# Patient Record
Sex: FEMALE | Race: WHITE | NOT HISPANIC OR LATINO | Employment: OTHER | URBAN - METROPOLITAN AREA
[De-identification: names, ages, dates, MRNs, and addresses within clinical notes are randomized per-mention and may not be internally consistent; named-entity substitution may affect disease eponyms.]

---

## 2018-12-20 ENCOUNTER — TELEPHONE (OUTPATIENT)
Dept: RHEUMATOLOGY | Facility: CLINIC | Age: 53
End: 2018-12-20

## 2018-12-20 NOTE — TELEPHONE ENCOUNTER
Contacted patient scheduled new patient appointment. Patient aware of date and time of appointments. Needs to be evaluated soon will transfer to [facility] when appointment available.

## 2018-12-20 NOTE — TELEPHONE ENCOUNTER
----- Message from Mary Zelaya sent at 12/20/2018 12:54 PM CST -----  Contact: Patient  Type: Needs Medical Advice    Who Called:  Patient  Symptoms (please be specific):  Lupus  How long has patient had these symptoms:  na  Pharmacy name and phone #:  baldo  Best Call Back Number:   Additional Information: Calling to schedule a new patient appt. Please advise.

## 2019-01-02 ENCOUNTER — HOSPITAL ENCOUNTER (OUTPATIENT)
Dept: RADIOLOGY | Facility: HOSPITAL | Age: 54
Discharge: HOME | End: 2019-01-02
Attending: STUDENT IN AN ORGANIZED HEALTH CARE EDUCATION/TRAINING PROGRAM
Payer: MEDICARE

## 2019-01-02 ENCOUNTER — OFFICE VISIT (OUTPATIENT)
Dept: RHEUMATOLOGY | Facility: CLINIC | Age: 54
End: 2019-01-02
Payer: MEDICARE

## 2019-01-02 VITALS — HEART RATE: 106 BPM | WEIGHT: 91.94 LBS | DIASTOLIC BLOOD PRESSURE: 63 MMHG | SYSTOLIC BLOOD PRESSURE: 102 MMHG

## 2019-01-02 DIAGNOSIS — M32.9 SYSTEMIC LUPUS ERYTHEMATOSUS, UNSPECIFIED SLE TYPE, UNSPECIFIED ORGAN INVOLVEMENT STATUS: ICD-10-CM

## 2019-01-02 DIAGNOSIS — M32.9 SYSTEMIC LUPUS ERYTHEMATOSUS, UNSPECIFIED SLE TYPE, UNSPECIFIED ORGAN INVOLVEMENT STATUS: Primary | ICD-10-CM

## 2019-01-02 PROCEDURE — 73130 XR HAND COMPLETE 3 VIEWS BILATERAL: ICD-10-PCS | Mod: 26,50,, | Performed by: RADIOLOGY

## 2019-01-02 PROCEDURE — 99204 OFFICE O/P NEW MOD 45 MIN: CPT | Mod: S$PBB,,, | Performed by: STUDENT IN AN ORGANIZED HEALTH CARE EDUCATION/TRAINING PROGRAM

## 2019-01-02 PROCEDURE — 99999 PR PBB SHADOW E&M-EST. PATIENT-LVL III: ICD-10-PCS | Mod: PBBFAC,,, | Performed by: STUDENT IN AN ORGANIZED HEALTH CARE EDUCATION/TRAINING PROGRAM

## 2019-01-02 PROCEDURE — 99204 PR OFFICE/OUTPT VISIT, NEW, LEVL IV, 45-59 MIN: ICD-10-PCS | Mod: S$PBB,,, | Performed by: STUDENT IN AN ORGANIZED HEALTH CARE EDUCATION/TRAINING PROGRAM

## 2019-01-02 PROCEDURE — 73130 X-RAY EXAM OF HAND: CPT | Mod: 26,50,, | Performed by: RADIOLOGY

## 2019-01-02 PROCEDURE — 73130 X-RAY EXAM OF HAND: CPT | Mod: 50,TC,FY,PO

## 2019-01-02 PROCEDURE — 73030 X-RAY EXAM OF SHOULDER: CPT | Mod: 26,50,, | Performed by: RADIOLOGY

## 2019-01-02 PROCEDURE — 99999 PR PBB SHADOW E&M-EST. PATIENT-LVL III: CPT | Mod: PBBFAC,,, | Performed by: STUDENT IN AN ORGANIZED HEALTH CARE EDUCATION/TRAINING PROGRAM

## 2019-01-02 PROCEDURE — 73030 XR SHOULDER COMPLETE 2 OR MORE VIEWS BILATERAL: ICD-10-PCS | Mod: 26,50,, | Performed by: RADIOLOGY

## 2019-01-02 PROCEDURE — 73030 X-RAY EXAM OF SHOULDER: CPT | Mod: TC,50,FY,PO

## 2019-01-02 PROCEDURE — 99213 OFFICE O/P EST LOW 20 MIN: CPT | Mod: PBBFAC,25,PO | Performed by: STUDENT IN AN ORGANIZED HEALTH CARE EDUCATION/TRAINING PROGRAM

## 2019-01-02 RX ORDER — OXYCODONE AND ACETAMINOPHEN 7.5; 325 MG/1; MG/1
TABLET ORAL
COMMUNITY
End: 2019-09-19

## 2019-01-02 RX ORDER — PANTOPRAZOLE SODIUM 40 MG/1
40 TABLET, DELAYED RELEASE ORAL DAILY
COMMUNITY
Start: 2017-11-20

## 2019-01-02 RX ORDER — HYDROCODONE BITARTRATE AND ACETAMINOPHEN 7.5; 325 MG/1; MG/1
TABLET ORAL
Refills: 0 | COMMUNITY
Start: 2018-10-01 | End: 2019-09-19

## 2019-01-02 RX ORDER — MIDODRINE HYDROCHLORIDE 5 MG/1
10 TABLET ORAL
Refills: 11 | COMMUNITY
Start: 2018-12-08 | End: 2020-04-30

## 2019-01-02 RX ORDER — PROMETHAZINE HYDROCHLORIDE 25 MG/1
25 TABLET ORAL EVERY 6 HOURS
COMMUNITY

## 2019-01-02 RX ORDER — THYROID 30 MG/1
30 TABLET ORAL DAILY
COMMUNITY

## 2019-01-02 RX ORDER — HYDROCODONE BITARTRATE AND ACETAMINOPHEN 10; 325 MG/1; MG/1
TABLET ORAL
Refills: 0 | COMMUNITY
Start: 2018-10-12 | End: 2019-09-19

## 2019-01-02 RX ORDER — PROMETHAZINE HYDROCHLORIDE 6.25 MG/5ML
SYRUP ORAL
COMMUNITY
End: 2019-09-19

## 2019-01-02 RX ORDER — GABAPENTIN 600 MG/1
100 TABLET ORAL
COMMUNITY
Start: 2017-11-22

## 2019-01-02 RX ORDER — HYDROXYCHLOROQUINE SULFATE 200 MG/1
200 TABLET, FILM COATED ORAL DAILY
Qty: 30 TABLET | Refills: 11 | Status: SHIPPED | OUTPATIENT
Start: 2019-01-02 | End: 2019-09-19

## 2019-01-02 RX ORDER — PROMETHAZINE HYDROCHLORIDE 6.25 MG/5ML
SYRUP ORAL
Refills: 0 | COMMUNITY
Start: 2018-12-31 | End: 2020-04-30

## 2019-01-02 RX ORDER — PYRIDOSTIGMINE BROMIDE 60 MG/1
60 TABLET ORAL 3 TIMES DAILY
COMMUNITY
Start: 2017-10-13

## 2019-01-02 RX ORDER — METHOCARBAMOL 750 MG/1
TABLET, FILM COATED ORAL
COMMUNITY
End: 2020-04-30

## 2019-01-02 RX ORDER — TEMAZEPAM 7.5 MG/1
CAPSULE ORAL
COMMUNITY
End: 2019-04-03 | Stop reason: ALTCHOICE

## 2019-01-02 RX ORDER — HYDROCODONE BITARTRATE AND ACETAMINOPHEN 7.5; 325 MG/15ML; MG/15ML
SOLUTION ORAL
COMMUNITY
End: 2019-09-19

## 2019-01-02 RX ORDER — OXYCODONE AND ACETAMINOPHEN 5; 325 MG/1; MG/1
TABLET ORAL
COMMUNITY
End: 2019-09-19

## 2019-01-02 RX ORDER — LIFITEGRAST 50 MG/ML
SOLUTION/ DROPS OPHTHALMIC
Refills: 11 | COMMUNITY
Start: 2018-11-15 | End: 2020-04-30

## 2019-01-02 RX ORDER — DOCUSATE SODIUM 100 MG/1
100 CAPSULE, LIQUID FILLED ORAL
COMMUNITY

## 2019-01-02 RX ORDER — HYDROCODONE BITARTRATE AND ACETAMINOPHEN 10; 325 MG/1; MG/1
TABLET ORAL
COMMUNITY
End: 2019-09-19

## 2019-01-02 RX ORDER — DICLOFENAC SODIUM 10 MG/G
2 GEL TOPICAL 4 TIMES DAILY
Qty: 1 TUBE | Refills: 3 | Status: SHIPPED | OUTPATIENT
Start: 2019-01-02 | End: 2019-09-19

## 2019-01-02 RX ORDER — EPINEPHRINE 0.3 MG/.3ML
INJECTION SUBCUTANEOUS
COMMUNITY

## 2019-01-02 RX ORDER — ONDANSETRON 4 MG/1
TABLET, ORALLY DISINTEGRATING ORAL
COMMUNITY
End: 2020-04-30

## 2019-01-02 RX ORDER — VALACYCLOVIR HYDROCHLORIDE 500 MG/1
TABLET, FILM COATED ORAL
COMMUNITY
End: 2019-09-19

## 2019-01-02 RX ORDER — MIDODRINE HYDROCHLORIDE 5 MG/1
TABLET ORAL
COMMUNITY
Start: 2016-10-04 | End: 2019-09-19

## 2019-01-02 RX ORDER — HYDROXYCHLOROQUINE SULFATE 200 MG/1
TABLET, FILM COATED ORAL
COMMUNITY
End: 2020-04-30 | Stop reason: SDUPTHER

## 2019-01-02 RX ORDER — RIZATRIPTAN BENZOATE 10 MG/1
TABLET, ORALLY DISINTEGRATING ORAL
COMMUNITY
End: 2019-09-19

## 2019-01-02 RX ORDER — TOPIRAMATE SPINKLE 25 MG/1
50 CAPSULE ORAL 2 TIMES DAILY
COMMUNITY
End: 2020-04-30

## 2019-01-02 RX ORDER — ONDANSETRON 4 MG/1
TABLET, FILM COATED ORAL
COMMUNITY
Start: 2017-06-22 | End: 2019-09-19

## 2019-01-02 RX ORDER — HYDROCODONE BITARTRATE AND ACETAMINOPHEN 7.5; 325 MG/1; MG/1
TABLET ORAL
COMMUNITY
End: 2019-09-19

## 2019-01-02 RX ORDER — MEPERIDINE HYDROCHLORIDE 50 MG/1
TABLET ORAL
COMMUNITY
End: 2019-09-19

## 2019-01-02 RX ORDER — PREDNISONE 10 MG/1
TABLET ORAL
COMMUNITY
End: 2019-09-19

## 2019-01-02 RX ORDER — DIAZEPAM 5 MG/1
TABLET ORAL
COMMUNITY
Start: 2017-09-03 | End: 2019-09-19

## 2019-01-02 RX ORDER — ERGOCALCIFEROL 1.25 MG/1
CAPSULE ORAL
Refills: 3 | COMMUNITY
Start: 2018-10-15

## 2019-01-02 RX ORDER — TAMSULOSIN HYDROCHLORIDE 0.4 MG/1
CAPSULE ORAL
COMMUNITY
End: 2019-09-19

## 2019-01-02 RX ORDER — OXYCODONE AND ACETAMINOPHEN 7.5; 325 MG/1; MG/1
TABLET ORAL
Refills: 0 | COMMUNITY
Start: 2018-12-06 | End: 2019-09-19

## 2019-01-02 NOTE — PROGRESS NOTES
RHEUMATOLOGY OUTPATIENT CLINIC NOTE    1/2/2019    Attending Rheumatologist: Bella Pastor  Primary Care Provider: Primary Doctor No   Physician Requesting Consultation: Aaareferral Self  No address on file  Chief Complaint/Reason For Consultation:  New Patient (Establish care with new Rheumatologist)      Subjective:       HPI  Kayla Bailey is a 53 y.o. White female    -Diagnosed w SLE 20 years ago, had previously been following with a rheumatologist in [place] , Dr Purvis.  Main manifestations: transaminitis, fatigue, pericarditis, photosensitivity, malar rash, and 2 miscarriages (1 in first trimester, and one in second).  Recently with worsening pain in bilateral shoulders, mcps, and pips. AM stiffness >30min. Taking tylenol prn pain relief. Taking plaquenil 200mg daily, has overall been stable with this regimen. No other new complaints or symptoms.     Review of Systems   Constitutional: Negative for chills, fever and weight loss.   HENT: Negative for congestion, hearing loss, sinus pain and sore throat.    Eyes: Negative for blurred vision, photophobia, pain and redness.   Respiratory: Negative for cough, sputum production and shortness of breath.    Cardiovascular: Negative for chest pain, palpitations and leg swelling.   Gastrointestinal: Negative for abdominal pain, blood in stool, heartburn, nausea and vomiting.   Genitourinary: Negative for dysuria, frequency and hematuria.   Musculoskeletal: Negative for back pain,+ joint pain, myalgias and neck pain.   Skin: Negative for itching and rash.   Neurological: Negative for dizziness, focal weakness, seizures and headaches.   Endo/Heme/Allergies: Negative for polydipsia. Does not bruise/bleed easily.   All other systems reviewed and are negative.        Chronic comorbid conditions affecting medical decision making today:  No past medical history on file.  No past surgical history on file.  No family history on file.  Social History     Substance and  Sexual Activity   Alcohol Use Not on file     Social History     Tobacco Use   Smoking Status Not on file     Social History     Substance and Sexual Activity   Drug Use Not on file       Current Outpatient Medications:     diazePAM (VALIUM) 5 MG tablet, diazepam 5 mg tablet, Disp: , Rfl:     docusate sodium (COLACE) 100 MG capsule, Take 100 mg by mouth., Disp: , Rfl:     EPINEPHrine (EPIPEN) 0.3 mg/0.3 mL AtIn, epinephrine 0.3 mg/0.3 mL injection, auto-injector, Disp: , Rfl:     ergocalciferol (ERGOCALCIFEROL) 50,000 unit Cap, TK 1 C PO ONCE A MONTH, Disp: , Rfl: 3    gabapentin (NEURONTIN) 600 MG tablet, 100 mg by FEEDING TUBE route., Disp: , Rfl:     hydroxychloroquine (PLAQUENIL) 200 mg tablet, hydroxychloroquine 200 mg tablet  TK 1 T PO QD, Disp: , Rfl:     methocarbamol (ROBAXIN) 750 MG Tab, methocarbamol 750 mg tablet  TAKE 1 TABLET BY MOUTH THREE TIMES DAILY, Disp: , Rfl:     midodrine (PROAMATINE) 5 MG Tab, TK 1 T PO QID, Disp: , Rfl: 11    midodrine (PROAMATINE) 5 MG Tab, midodrine 5 mg tablet  TK 1 T PO QID, Disp: , Rfl:     ondansetron (ZOFRAN ODT) 4 MG TbDL, Zofran ODT 4 mg disintegrating tablet  Let 1 tablet disolve in mouth every 6-8 hours as needed by oral route., Disp: , Rfl:     ondansetron (ZOFRAN) 4 MG tablet, ondansetron HCl 4 mg tablet, Disp: , Rfl:     oxyCODONE-acetaminophen (PERCOCET) 7.5-325 mg per tablet, oxycodone-acetaminophen 7.5 mg-325 mg tablet  TK 1 T PO  TID PRF PAIN. MAXIMUM DAILY DOSE OF 4 TS A DAY, Disp: , Rfl:     oxyCODONE-acetaminophen (PERCOCET) 7.5-325 mg per tablet, TK 1 T PO  TID PRF PAIN. MAXIMUM DAILY DOSE OF 4 TS A DAY, Disp: , Rfl: 0    pantoprazole (PROTONIX) 40 MG tablet, pantoprazole 40 mg tablet,delayed release, Disp: , Rfl:     promethazine (PHENERGAN) 25 MG tablet, promethazine 25 mg tablet, Disp: , Rfl:     promethazine (PHENERGAN) 6.25 mg/5 mL syrup, TK 20 ML PO QD PRN, Disp: , Rfl: 0    promethazine (PHENERGAN) 6.25 mg/5 mL syrup, promethazine  6.25 mg/5 mL syrup  TK 20 ML PO QD PRN, Disp: , Rfl:     pyridostigmine (MESTINON) 60 mg Tab, pyridostigmine bromide 60 mg tablet, Disp: , Rfl:     temazepam (RESTORIL) 7.5 MG Cap, Restoril 7.5 mg capsule  Take 1 capsule every day by oral route at bedtime., Disp: , Rfl:     thyroid, pork, (ARMOUR THYROID) 30 mg Tab, [place] Thyroid, Disp: , Rfl:     topiramate 25 mg capsule, topiramate 25 mg sprinkle capsule, Disp: , Rfl:     XIIDRA 5 % Dpet, INT 1 GTT IN BOTH EYES BID, Disp: , Rfl: 11    hydrocodone-acetaminophen (HYCET) solution 7.5-325 mg/15mL, hydrocodone 7.5 mg-acetaminophen 325 mg/15 mL oral solution, Disp: , Rfl:     HYDROcodone-acetaminophen (NORCO)  mg per tablet, TK 1 T PO TID PRN P, Disp: , Rfl: 0    HYDROcodone-acetaminophen (NORCO)  mg per tablet, hydrocodone 10 mg-acetaminophen 325 mg tablet  TK 1 T PO TID PRN P, Disp: , Rfl:     HYDROcodone-acetaminophen (NORCO) 7.5-325 mg per tablet, hydrocodone 7.5 mg-acetaminophen 325 mg tablet, Disp: , Rfl:     HYDROcodone-acetaminophen (NORCO) 7.5-325 mg per tablet, TK 1 T PO  Q 8 H PRN FOR PAIN, Disp: , Rfl: 0    meperidine (DEMEROL) 50 mg tablet, meperidine 50 mg tablet, Disp: , Rfl:     oxyCODONE-acetaminophen (PERCOCET) 5-325 mg per tablet, oxycodone-acetaminophen 5 mg-325 mg tablet, Disp: , Rfl:     predniSONE (DELTASONE) 10 MG tablet, prednisone 10 mg tablet, Disp: , Rfl:     rizatriptan (MAXALT-MLT) 10 MG disintegrating tablet, rizatriptan 10 mg disintegrating tablet, Disp: , Rfl:     tamsulosin (FLOMAX) 0.4 mg Cap, tamsulosin 0.4 mg capsule, Disp: , Rfl:     valACYclovir (VALTREX) 500 MG tablet, valacyclovir 500 mg tablet, Disp: , Rfl:        Objective:         Vitals:    01/02/19 1558   BP: 102/63   Pulse: 106       Physical Exam   Nursing note and vitals reviewed.  Constitutional:  oriented to person, place, and time and well-developed, well-nourished, and in no distress. No distress.   HENT:   Head: Normocephalic and  atraumatic.   Right Ear: External ear normal.   Left Ear: External ear normal.   Nose: Nose normal.   Mouth/Throat: Oropharynx is clear and moist.   Eyes: Conjunctivae and EOM are normal. Pupils are equal, round, and reactive to light. Right eye exhibits no discharge. Left eye exhibits no discharge.   Neck: Normal range of motion. Neck supple. No JVD present.   Cardiovascular: Normal rate, regular rhythm and normal heart sounds.    Pulmonary/Chest: Effort normal and breath sounds normal. No respiratory distress. She has no wheezes. She has no rales.  exhibits no tenderness.   Abdominal: Soft. Bowel sounds are normal.  exhibits no distension. There is no tenderness. There is no rebound.   Lymphadenopathy:  no cervical adenopathy.   Neurological: alert and oriented to person, place, and time. No cranial nerve deficit. Gait normal.   Skin: Skin is warm and dry. No rash noted. No erythema.   Psychiatric: Affect and judgment normal.   Musculoskeletal: No synovitis over small joints in hands and feet. No effusions over large joints. +impingment sign bilaterally w tenderness at subAcromial bursa.     Reviewed old and all outside pertinent medical records available.    All lab results personally reviewed and interpreted by me.  No results found for: WBC, HGB, HCT, MCV, MCH, MCHC, RDW, PLT, MPV, NEUTROABS, LYMPHOABS, MONOABS, EOSINOABS, BASOSABS, NEUTROPCT, LYMPHOPCT, MONOPCT, EOSINOPCT, BASOPCT, DIFFTYPE, RBCMORPHOLOG, PLTEST    No results found for: NA, K, CL, CO2, GLU, BUN, LABCREA, CALCIUM, PROT, ALBUMIN, BILITOT, AST, ALKPHOS, ALT, GFRAA, GFRNONAA    No results found for: COLORU, APPEARANCEUA, SPECGRAV, PHUR, PROTEINUA, GLUCOSEU, KETONESU, BLOODU, LEUKOCYTESUR, NITRITE, UROBILINOGEN    No results found for: CRP    No results found for: SEDRATE, ERYTHROCYTES    No results found for: DON, RF, SEDRATE    No components found for: 25OHVITDTOT, 88CYBDSS4, 42VHCFIK6, METHODNOTE    No results found for: URICACID    No  components found for: TSPOTTB           ASSESSMENT / PLAN:     Kayla Bailey is a 53 y.o. White female with:    #SLE  -diagnosed 20+ years ago, had been following w Dr Purvis in [place]   -main manifestations: malar rash, photosensitivity, fatigue, pericarditis  -will get baseline labs and studies, assess disease activity markers  -stable on plaquenil 200mg daily, continue current regimen  -will check RF/CCP , hand xr for eval inflammatory arthrititis    #Shoulder impingement  -discussed PT , can consider referral at rtc  -consider injection at rtc if no improvement  -prx voltaren gel      . Other specified counseling  - Immunization counseling done  - Weight loss counseling done  - Nutrition and exercise counseling  - Medication counseling provided      No Follow-up on file.    Method of contact with patient concerns: [facility]      Bella Pastor M.D.  Rheumatology Department   [facility] [place] [address], [place] [address]  Phone: [phone]  Fax: [phone]

## 2019-01-03 ENCOUNTER — PATIENT MESSAGE (OUTPATIENT)
Dept: RHEUMATOLOGY | Facility: CLINIC | Age: 54
End: 2019-01-03

## 2019-04-03 ENCOUNTER — PATIENT MESSAGE (OUTPATIENT)
Dept: RHEUMATOLOGY | Facility: CLINIC | Age: 54
End: 2019-04-03

## 2019-04-03 RX ORDER — TEMAZEPAM 30 MG/1
30 CAPSULE ORAL NIGHTLY PRN
Qty: 30 CAPSULE | Refills: 3 | Status: SHIPPED | OUTPATIENT
Start: 2019-04-03 | End: 2019-04-08

## 2019-04-08 ENCOUNTER — TELEPHONE (OUTPATIENT)
Dept: RHEUMATOLOGY | Facility: CLINIC | Age: 54
End: 2019-04-08

## 2019-04-08 RX ORDER — TEMAZEPAM 30 MG/1
30 CAPSULE ORAL NIGHTLY PRN
Qty: 30 CAPSULE | Refills: 3 | Status: SHIPPED | OUTPATIENT
Start: 2019-04-08 | End: 2019-05-08

## 2019-04-08 RX ORDER — TEMAZEPAM 30 MG/1
30 CAPSULE ORAL NIGHTLY PRN
Qty: 30 CAPSULE | Refills: 3 | Status: CANCELLED | OUTPATIENT
Start: 2019-04-08 | End: 2019-05-08

## 2019-04-08 NOTE — TELEPHONE ENCOUNTER
----- Message from Lilian Garcia sent at 4/8/2019 10:14 AM CDT -----  Contact: pt  1. What is the name of the medication you are requesting? Temazapam  2. What is the dose? n/a  3. How do you take the medication? Orally, topically, etc? n/a  4. How often do you take this medication? n/a  5. Do you need a 30 day or 90 day supply? n/a  6. How many refills are you requesting? n/a  7. What is your preferred pharmacy and location of the pharmacy? [facility] [address] [facility]'t [facility] this [facility]  8. Who can we contact with further questions? The pt at [phone]

## 2019-04-08 NOTE — TELEPHONE ENCOUNTER
Informed Mrs. Bailey rx refill has been faxed to [facility] in [place]. Mrs. Bailey voiced understanding

## 2019-04-08 NOTE — TELEPHONE ENCOUNTER
Dr. Pastor,  Mrs. Leo would like you to resend her rx for Temazepam to [facility] in [place] due to [facility] not having Temazepam in stock

## 2019-04-08 NOTE — TELEPHONE ENCOUNTER
Spoke with Mrs. Bailey she stated to disregard the previous message regarding Temazepam being unavailable at [facility] and if she requires further assistance she will call our clinic

## 2019-04-08 NOTE — TELEPHONE ENCOUNTER
----- Message from Lainey Doyle sent at 4/8/2019  2:21 PM CDT -----  Pt needs to speak to the nurse regarding send a rx to help her sleep to [facility] [phone]/please call [phone]/ma

## 2019-07-25 ENCOUNTER — PATIENT MESSAGE (OUTPATIENT)
Dept: RHEUMATOLOGY | Facility: CLINIC | Age: 54
End: 2019-07-25

## 2019-07-25 ENCOUNTER — TELEPHONE (OUTPATIENT)
Dept: RHEUMATOLOGY | Facility: CLINIC | Age: 54
End: 2019-07-25

## 2019-07-25 NOTE — TELEPHONE ENCOUNTER
Unable to leave voice message due to voicemail not being setup. I sent Mrs. Garibayn a message through the patient portal regarding rescheduling her appointment with Dr. Pastor

## 2019-07-29 ENCOUNTER — TELEPHONE (OUTPATIENT)
Dept: RHEUMATOLOGY | Facility: CLINIC | Age: 54
End: 2019-07-29

## 2019-07-29 NOTE — TELEPHONE ENCOUNTER
Spoke with Mrs. Bailey regarding rescheduling her scheduled appointment with Dr. Pastor 8/5/2019. Mrs. Moe stated that she is presently out of town and she will call clinic to reschedule or she will reschedule through the patient portal

## 2019-09-19 ENCOUNTER — LAB VISIT (OUTPATIENT)
Dept: LAB | Facility: HOSPITAL | Age: 54
End: 2019-09-19
Attending: STUDENT IN AN ORGANIZED HEALTH CARE EDUCATION/TRAINING PROGRAM
Payer: MEDICARE

## 2019-09-19 ENCOUNTER — OFFICE VISIT (OUTPATIENT)
Dept: RHEUMATOLOGY | Facility: CLINIC | Age: 54
End: 2019-09-19
Payer: MEDICARE

## 2019-09-19 ENCOUNTER — TELEPHONE (OUTPATIENT)
Dept: PHARMACY | Facility: CLINIC | Age: 54
End: 2019-09-19

## 2019-09-19 VITALS
SYSTOLIC BLOOD PRESSURE: 110 MMHG | HEART RATE: 85 BPM | BODY MASS INDEX: 17.21 KG/M2 | HEIGHT: 62 IN | DIASTOLIC BLOOD PRESSURE: 69 MMHG | WEIGHT: 93.5 LBS

## 2019-09-19 DIAGNOSIS — M32.9 SYSTEMIC LUPUS ERYTHEMATOSUS, UNSPECIFIED SLE TYPE, UNSPECIFIED ORGAN INVOLVEMENT STATUS: ICD-10-CM

## 2019-09-19 DIAGNOSIS — Z01.89 ENCOUNTER FOR OTHER SPECIFIED SPECIAL EXAMINATIONS: ICD-10-CM

## 2019-09-19 DIAGNOSIS — M70.60 TROCHANTERIC BURSITIS, UNSPECIFIED LATERALITY: ICD-10-CM

## 2019-09-19 DIAGNOSIS — R76.8 POSITIVE ANA (ANTINUCLEAR ANTIBODY): ICD-10-CM

## 2019-09-19 DIAGNOSIS — R76.8 POSITIVE ANA (ANTINUCLEAR ANTIBODY): Primary | ICD-10-CM

## 2019-09-19 LAB
ALBUMIN SERPL BCP-MCNC: 3.9 G/DL (ref 3.5–5.2)
ALP SERPL-CCNC: 89 U/L (ref 55–135)
ALT SERPL W/O P-5'-P-CCNC: 9 U/L (ref 10–44)
ANION GAP SERPL CALC-SCNC: 11 MMOL/L (ref 8–16)
AST SERPL-CCNC: 14 U/L (ref 10–40)
BASOPHILS # BLD AUTO: 0.09 K/UL (ref 0–0.2)
BASOPHILS NFR BLD: 1 % (ref 0–1.9)
BILIRUB SERPL-MCNC: 0.2 MG/DL (ref 0.1–1)
BUN SERPL-MCNC: 8 MG/DL (ref 6–20)
C3 SERPL-MCNC: 130 MG/DL (ref 50–180)
C4 SERPL-MCNC: 26 MG/DL (ref 11–44)
CALCIUM SERPL-MCNC: 9.5 MG/DL (ref 8.7–10.5)
CHLORIDE SERPL-SCNC: 112 MMOL/L (ref 95–110)
CO2 SERPL-SCNC: 22 MMOL/L (ref 23–29)
CREAT SERPL-MCNC: 0.8 MG/DL (ref 0.5–1.4)
CRP SERPL-MCNC: 3.2 MG/L (ref 0–8.2)
DIFFERENTIAL METHOD: ABNORMAL
EOSINOPHIL # BLD AUTO: 0.2 K/UL (ref 0–0.5)
EOSINOPHIL NFR BLD: 1.7 % (ref 0–8)
ERYTHROCYTE [DISTWIDTH] IN BLOOD BY AUTOMATED COUNT: 13.5 % (ref 11.5–14.5)
ERYTHROCYTE [SEDIMENTATION RATE] IN BLOOD BY WESTERGREN METHOD: 3 MM/HR (ref 0–36)
EST. GFR  (AFRICAN AMERICAN): >60 ML/MIN/1.73 M^2
EST. GFR  (NON AFRICAN AMERICAN): >60 ML/MIN/1.73 M^2
GLUCOSE SERPL-MCNC: 93 MG/DL (ref 70–110)
HCT VFR BLD AUTO: 46.1 % (ref 37–48.5)
HGB BLD-MCNC: 14.7 G/DL (ref 12–16)
IMM GRANULOCYTES # BLD AUTO: 0.02 K/UL (ref 0–0.04)
IMM GRANULOCYTES NFR BLD AUTO: 0.2 % (ref 0–0.5)
LYMPHOCYTES # BLD AUTO: 1.7 K/UL (ref 1–4.8)
LYMPHOCYTES NFR BLD: 18.4 % (ref 18–48)
MCH RBC QN AUTO: 31.2 PG (ref 27–31)
MCHC RBC AUTO-ENTMCNC: 31.9 G/DL (ref 32–36)
MCV RBC AUTO: 98 FL (ref 82–98)
MONOCYTES # BLD AUTO: 0.3 K/UL (ref 0.3–1)
MONOCYTES NFR BLD: 3.4 % (ref 4–15)
NEUTROPHILS # BLD AUTO: 6.9 K/UL (ref 1.8–7.7)
NEUTROPHILS NFR BLD: 75.5 % (ref 38–73)
NRBC BLD-RTO: 0 /100 WBC
PLATELET # BLD AUTO: 263 K/UL (ref 150–350)
PMV BLD AUTO: 10.1 FL (ref 9.2–12.9)
POTASSIUM SERPL-SCNC: 3.9 MMOL/L (ref 3.5–5.1)
PROT SERPL-MCNC: 7 G/DL (ref 6–8.4)
RBC # BLD AUTO: 4.71 M/UL (ref 4–5.4)
SODIUM SERPL-SCNC: 145 MMOL/L (ref 136–145)
WBC # BLD AUTO: 9.18 K/UL (ref 3.9–12.7)

## 2019-09-19 PROCEDURE — 80053 COMPREHEN METABOLIC PANEL: CPT

## 2019-09-19 PROCEDURE — 99999 PR PBB SHADOW E&M-EST. PATIENT-LVL IV: ICD-10-PCS | Mod: PBBFAC,,, | Performed by: STUDENT IN AN ORGANIZED HEALTH CARE EDUCATION/TRAINING PROGRAM

## 2019-09-19 PROCEDURE — 80074 ACUTE HEPATITIS PANEL: CPT

## 2019-09-19 PROCEDURE — 85025 COMPLETE CBC W/AUTO DIFF WBC: CPT

## 2019-09-19 PROCEDURE — 85652 RBC SED RATE AUTOMATED: CPT

## 2019-09-19 PROCEDURE — 36415 COLL VENOUS BLD VENIPUNCTURE: CPT

## 2019-09-19 PROCEDURE — 86160 COMPLEMENT ANTIGEN: CPT

## 2019-09-19 PROCEDURE — 99214 OFFICE O/P EST MOD 30 MIN: CPT | Mod: 25,S$PBB,, | Performed by: STUDENT IN AN ORGANIZED HEALTH CARE EDUCATION/TRAINING PROGRAM

## 2019-09-19 PROCEDURE — 20610 DRAIN/INJ JOINT/BURSA W/O US: CPT | Mod: PBBFAC | Performed by: STUDENT IN AN ORGANIZED HEALTH CARE EDUCATION/TRAINING PROGRAM

## 2019-09-19 PROCEDURE — 20610 LARGE JOINT ASPIRATION/INJECTION: ICD-10-PCS | Mod: S$PBB,LT,, | Performed by: STUDENT IN AN ORGANIZED HEALTH CARE EDUCATION/TRAINING PROGRAM

## 2019-09-19 PROCEDURE — 99999 PR PBB SHADOW E&M-EST. PATIENT-LVL IV: CPT | Mod: PBBFAC,,, | Performed by: STUDENT IN AN ORGANIZED HEALTH CARE EDUCATION/TRAINING PROGRAM

## 2019-09-19 PROCEDURE — 86140 C-REACTIVE PROTEIN: CPT

## 2019-09-19 PROCEDURE — 86160 COMPLEMENT ANTIGEN: CPT | Mod: 59

## 2019-09-19 PROCEDURE — 99214 PR OFFICE/OUTPT VISIT, EST, LEVL IV, 30-39 MIN: ICD-10-PCS | Mod: 25,S$PBB,, | Performed by: STUDENT IN AN ORGANIZED HEALTH CARE EDUCATION/TRAINING PROGRAM

## 2019-09-19 PROCEDURE — 99214 OFFICE O/P EST MOD 30 MIN: CPT | Mod: PBBFAC,25 | Performed by: STUDENT IN AN ORGANIZED HEALTH CARE EDUCATION/TRAINING PROGRAM

## 2019-09-19 PROCEDURE — 86225 DNA ANTIBODY NATIVE: CPT

## 2019-09-19 RX ORDER — HYDROXYCHLOROQUINE SULFATE 200 MG/1
200 TABLET, FILM COATED ORAL DAILY
Qty: 30 TABLET | Refills: 6 | Status: SHIPPED | OUTPATIENT
Start: 2019-09-19 | End: 2020-08-27 | Stop reason: SDUPTHER

## 2019-09-19 RX ORDER — TRIAMCINOLONE ACETONIDE 40 MG/ML
40 INJECTION, SUSPENSION INTRA-ARTICULAR; INTRAMUSCULAR
Status: DISCONTINUED | OUTPATIENT
Start: 2019-09-19 | End: 2019-09-19 | Stop reason: HOSPADM

## 2019-09-19 RX ORDER — FOLIC ACID 1 MG/1
1 TABLET ORAL DAILY
Qty: 30 TABLET | Refills: 4 | Status: SHIPPED | OUTPATIENT
Start: 2019-09-19 | End: 2020-04-30 | Stop reason: SDUPTHER

## 2019-09-19 RX ORDER — METHOTREXATE 2.5 MG/1
TABLET ORAL
Qty: 30 TABLET | Refills: 3 | Status: SHIPPED | OUTPATIENT
Start: 2019-09-19 | End: 2019-09-19 | Stop reason: SDUPTHER

## 2019-09-19 RX ORDER — METHOTREXATE 2.5 MG/1
10 TABLET ORAL
Qty: 20 TABLET | Refills: 3 | Status: SHIPPED | OUTPATIENT
Start: 2019-09-19 | End: 2019-10-23

## 2019-09-19 RX ADMIN — TRIAMCINOLONE ACETONIDE 40 MG: 40 INJECTION, SUSPENSION INTRA-ARTICULAR; INTRAMUSCULAR at 02:09

## 2019-09-19 NOTE — TELEPHONE ENCOUNTER
Informed Patient  that [facility] received prescription for Rasuvo and prior authorization is required.  [facility] will be back in touch once insurance determination is received.

## 2019-09-19 NOTE — PROGRESS NOTES
RHEUMATOLOGY OUTPATIENT CLINIC NOTE    9/19/2019    Attending Rheumatologist: Bella Pastor  Primary Care Provider: Primary Doctor No   Physician Requesting Consultation: No referring provider defined for this encounter.  Chief Complaint/Reason For Consultation:  Appointment (pain in wrist and elbows on and off for a couple of months)      Subjective:       YESSI Bailey is a 53 y.o. White female    -Diagnosed w SLE 20 years ago, had previously been following with a rheumatologist in [place] , Dr Purvis.  Main manifestations: transaminitis, fatigue, pericarditis, photosensitivity, malar rash, and 2 miscarriages (1 in first trimester, and one in second).  Recently with worsening pain in bilateral shoulders, mcps, and pips. AM stiffness >30min. Taking tylenol prn pain relief. Taking plaquenil 200mg daily, has overall been stable with this regimen.      Main complaint is  left hip pain. Localized to lateral aspect. +Pain when sleeping on side and walking. Pain 7/10 constant, non-radiating, aching.     Review of Systems   Constitutional: Negative for chills, fever and weight loss.   HENT: Negative for congestion, hearing loss, sinus pain and sore throat.    Eyes: Negative for blurred vision, photophobia, pain and redness.   Respiratory: Negative for cough, sputum production and shortness of breath.    Cardiovascular: Negative for chest pain, palpitations and leg swelling.   Gastrointestinal: Negative for abdominal pain, blood in stool, heartburn, nausea and vomiting.   Genitourinary: Negative for dysuria, frequency and hematuria.   Musculoskeletal: Negative for back pain,+ joint pain, myalgias and neck pain.   Skin: Negative for itching and rash.   Neurological: Negative for dizziness, focal weakness, seizures and headaches.   Endo/Heme/Allergies: Negative for polydipsia. Does not bruise/bleed easily.   All other systems reviewed and are negative.        Chronic comorbid conditions affecting medical  decision making today:  Past Medical History:   Diagnosis Date    Chiari malformation type I     Dysautonomia     Gastroparesis     Hashimoto's disease     Lupus      Past Surgical History:   Procedure Laterality Date    APPENDECTOMY      HYSTERECTOMY      INSERTION OF PERCUTANEOUS ENDOSCOPIC GASTROSTOMY (PEG) FEEDING TUBE      SINUS SURGERY       Family History   Adopted: Yes     Social History     Substance and Sexual Activity   Alcohol Use Never    Frequency: Never     Social History     Tobacco Use   Smoking Status Current Every Day Smoker    Packs/day: 1.00     Social History     Substance and Sexual Activity   Drug Use Never       Current Outpatient Medications:     docusate sodium (COLACE) 100 MG capsule, Take 100 mg by mouth., Disp: , Rfl:     EPINEPHrine (EPIPEN) 0.3 mg/0.3 mL AtIn, epinephrine 0.3 mg/0.3 mL injection, auto-injector, Disp: , Rfl:     ergocalciferol (ERGOCALCIFEROL) 50,000 unit Cap, TK 1 C PO ONCE A MONTH, Disp: , Rfl: 3    gabapentin (NEURONTIN) 600 MG tablet, 100 mg by FEEDING TUBE route., Disp: , Rfl:     hydroxychloroquine (PLAQUENIL) 200 mg tablet, hydroxychloroquine 200 mg tablet  TK 1 T PO QD, Disp: , Rfl:     methocarbamol (ROBAXIN) 750 MG Tab, methocarbamol 750 mg tablet  TAKE 1 TABLET BY MOUTH THREE TIMES DAILY, Disp: , Rfl:     midodrine (PROAMATINE) 5 MG Tab, TK 1 T PO QID, Disp: , Rfl: 11    ondansetron (ZOFRAN ODT) 4 MG TbDL, Zofran ODT 4 mg disintegrating tablet  Let 1 tablet disolve in mouth every 6-8 hours as needed by oral route., Disp: , Rfl:     pantoprazole (PROTONIX) 40 MG tablet, pantoprazole 40 mg tablet,delayed release, Disp: , Rfl:     promethazine (PHENERGAN) 25 MG tablet, promethazine 25 mg tablet, Disp: , Rfl:     promethazine (PHENERGAN) 6.25 mg/5 mL syrup, TK 20 ML PO QD PRN, Disp: , Rfl: 0    pyridostigmine (MESTINON) 60 mg Tab, pyridostigmine bromide 60 mg tablet, Disp: , Rfl:     thyroid, pork, (ARMOUR THYROID) 30 mg Tab, [place]  Thyroid, Disp: , Rfl:     topiramate 25 mg capsule, topiramate 25 mg sprinkle capsule, Disp: , Rfl:     XIIDRA 5 % Dpet, INT 1 GTT IN BOTH EYES BID, Disp: , Rfl: 11       Objective:         Vitals:    09/19/19 1111   BP: 110/69   Pulse: 85       Physical Exam   Nursing note and vitals reviewed.  Constitutional:  oriented to person, place, and time and well-developed, well-nourished, and in no distress. No distress.   HENT:   Head: Normocephalic and atraumatic.   Right Ear: External ear normal.   Left Ear: External ear normal.   Nose: Nose normal.   Mouth/Throat: Oropharynx is clear and moist.   Eyes: Conjunctivae and EOM are normal. Pupils are equal, round, and reactive to light. Right eye exhibits no discharge. Left eye exhibits no discharge.   Neck: Normal range of motion. Neck supple. No JVD present.   Cardiovascular: Normal rate, regular rhythm and normal heart sounds.    Pulmonary/Chest: Effort normal and breath sounds normal. No respiratory distress. She has no wheezes. She has no rales.  exhibits no tenderness.   Abdominal: Soft. Bowel sounds are normal.  exhibits no distension. There is no tenderness. There is no rebound.   Lymphadenopathy:  no cervical adenopathy.   Neurological: alert and oriented to person, place, and time. No cranial nerve deficit. Gait normal.   Skin: Skin is warm and dry. No rash noted. No erythema.   Psychiatric: Affect and judgment normal.   Musculoskeletal: No synovitis over small joints in hands and feet. No effusions over large joints.      Reviewed old and all outside pertinent medical records available.    All lab results personally reviewed and interpreted by me.  Lab Results   Component Value Date    WBC 5.35 01/02/2019    HGB 15.8 01/02/2019    HCT 47.6 01/02/2019    MCV 96 01/02/2019    MCH 32.0 (H) 01/02/2019    MCHC 33.2 01/02/2019    RDW 12.4 01/02/2019     01/02/2019    MPV 11.1 01/02/2019       Lab Results   Component Value Date     01/02/2019    K 4.2  01/02/2019     01/02/2019    CO2 24 01/02/2019    GLU 85 01/02/2019    BUN 10 01/02/2019    CALCIUM 10.0 01/02/2019    PROT 7.6 01/02/2019    ALBUMIN 4.4 01/02/2019    BILITOT 0.5 01/02/2019    AST 20 01/02/2019    ALKPHOS 82 01/02/2019    ALT 18 01/02/2019       Lab Results   Component Value Date    COLORU Yellow 01/02/2019    APPEARANCEUA Clear 01/02/2019    SPECGRAV 1.020 01/02/2019    PHUR 7.0 01/02/2019    PROTEINUA Negative 01/02/2019    KETONESU Negative 01/02/2019    LEUKOCYTESUR Negative 01/02/2019    NITRITE Negative 01/02/2019       Lab Results   Component Value Date    CRP 2.0 01/02/2019       Lab Results   Component Value Date    SEDRATE 1 01/02/2019       Lab Results   Component Value Date    SEDRATE 1 01/02/2019       No components found for: 25OHVITDTOT, 18JBHFUR4, 76MCPORK0, METHODNOTE    No results found for: URICACID    No components found for: TSPOTTB    Large Joint Aspiration/Injection  Date/Time: 9/19/2019 2:03 PM  Performed by: Bella Pastor MD  Authorized by: Bella Pastor MD     Consent Done?:  Yes (Verbal)  Indications:  Pain  Procedure site marked: Yes    Timeout: Prior to procedure the correct patient, procedure, and site was verified    Anesthesia  Local anesthesia used  Anesthesia: local infiltration  Anesthetic: lidocaine 2% without epinephrine  Anesthetic total: 2mL    Location:  Hip  Prep: Patient was prepped and draped in usual sterile fashion    Needle size:  25 G  Approach:  Lateral  Medications:  40 mg triamcinolone acetonide 40 mg/mL  Patient tolerance:  Patient tolerated the procedure well with no immediate complications             ASSESSMENT / PLAN:     Kayla Bailey is a 53 y.o. White female with:    #SLE  -diagnosed 20+ years ago, had been following w Dr Purvis in [place]   -main manifestations: malar rash, photosensitivity, fatigue, pericarditis  -disease activity markers quiescent, will repeat today  -stable on plaquenil 200mg daily, continue current  regimen  -will check RF/CCP , hand xr with periarticular osteopenia  -Intolerant to mtx 2.2 dysphagia/GI. Start rasuvo 15mg wkly +FA daily    #Trochanteric bursitis  -left tb injected today     rtc 3 months  Method of contact with patient concerns: Lu[facility] attn Rheumatology      Bella Pastor M.D.  Rheumatology Department   [facility] [place] [address], [place] [address]  Phone: [phone]  Fax: [phone]

## 2019-09-20 LAB
DSDNA AB SER-ACNC: NORMAL [IU]/ML
HAV IGM SERPL QL IA: NEGATIVE
HBV CORE IGM SERPL QL IA: NEGATIVE
HBV SURFACE AG SERPL QL IA: NEGATIVE
HCV AB SERPL QL IA: NEGATIVE

## 2019-10-22 ENCOUNTER — PATIENT MESSAGE (OUTPATIENT)
Dept: RHEUMATOLOGY | Facility: CLINIC | Age: 54
End: 2019-10-22

## 2019-10-23 DIAGNOSIS — M32.9 SYSTEMIC LUPUS ERYTHEMATOSUS, UNSPECIFIED SLE TYPE, UNSPECIFIED ORGAN INVOLVEMENT STATUS: ICD-10-CM

## 2019-10-24 NOTE — TELEPHONE ENCOUNTER
Spoke with Mrs. Bailey she stated that she will wait to have her flu vaccine at her next appointment due to the cost at the pharmacy

## 2019-10-29 NOTE — TELEPHONE ENCOUNTER
DOCUMENTATION ONLY:     Appeal OVERTURNED for Rasuvo APPROVED from 9/26/19 to 12/31/20.      Case ID# [id number]    Co-Pay: $131.82    Patient Assistance IS required.     Forward to patient assistance for review.      JERALD

## 2019-10-30 NOTE — TELEPHONE ENCOUNTER
[facility] and gives permission for HW paul to be obtained on her behalf. Explained that medication will be billed to her insurance still and paul will cover her portion of the copay. She will also be able to use the paul to cover the cost of her Plaquenil that she fills at her local [facility]. Patient verbalized understanding.

## 2019-10-30 NOTE — TELEPHONE ENCOUNTER
FOR DOCUMENTATION ONLY:  Financial Assistance for R[place]ie approved from 9/30/19 to 9/29/20  [facility]  BIN: [id number]  PCN: [id number]  ID: [id number]  Group: [id number]  $15,000 Award  $0.00 Copay

## 2019-10-31 ENCOUNTER — TELEPHONE (OUTPATIENT)
Dept: PHARMACY | Facility: CLINIC | Age: 54
End: 2019-10-31

## 2019-10-31 NOTE — TELEPHONE ENCOUNTER
Rasuvo 15mg/ 0.3ml Initial Consult completed with pt on 10/31. Shipping Rasuvo via [employer] on 10/4 for patient to receive 10/5. Patient verified address. Copay is $0 in 004. Patient said she will start medication on 10/5. Rasuvo injection training provided to pt. Pt expressed understanding. Med list, conditions, and allergies confirmed with pt. [facility] clinical services and refill process explained to patient. Patient voiced understanding. All questions answered and addressed to patients satisfaction. RPh will follow up with patient 1 week from start and [facility] will follow up with patient in 3 weeks for a refill. Verified patient name and  for HIPAA purposes.

## 2019-10-31 NOTE — TELEPHONE ENCOUNTER
Attempted to contact pt for Rasuvo initial consult on 10/31. $0 copay in 004. Pt did not have VM set up, and unable to LVM. Will continue to follow up with pt.

## 2019-11-14 ENCOUNTER — PATIENT MESSAGE (OUTPATIENT)
Dept: RHEUMATOLOGY | Facility: CLINIC | Age: 54
End: 2019-11-14

## 2019-11-18 RX ORDER — CLOBETASOL PROPIONATE 0.5 MG/G
OINTMENT TOPICAL 2 TIMES DAILY
Qty: 15 G | Refills: 1 | Status: SHIPPED | OUTPATIENT
Start: 2019-11-18

## 2019-11-19 ENCOUNTER — PATIENT MESSAGE (OUTPATIENT)
Dept: RHEUMATOLOGY | Facility: CLINIC | Age: 54
End: 2019-11-19

## 2019-11-27 ENCOUNTER — TELEPHONE (OUTPATIENT)
Dept: PHARMACY | Facility: CLINIC | Age: 54
End: 2019-11-27

## 2019-12-02 ENCOUNTER — PATIENT MESSAGE (OUTPATIENT)
Dept: RHEUMATOLOGY | Facility: CLINIC | Age: 54
End: 2019-12-02

## 2019-12-05 ENCOUNTER — TELEPHONE (OUTPATIENT)
Dept: RHEUMATOLOGY | Facility: CLINIC | Age: 54
End: 2019-12-05

## 2019-12-09 ENCOUNTER — TELEPHONE (OUTPATIENT)
Dept: RHEUMATOLOGY | Facility: CLINIC | Age: 54
End: 2019-12-09

## 2019-12-09 NOTE — TELEPHONE ENCOUNTER
----- Message from Barbra Lezama sent at 12/9/2019  8:17 AM CST -----  Contact: Pt   States she was placed on torodal and steriods for 5 days and is feeling bad / pt is in pain with body aches and is wanting to knw to counter that pain from the meds she's been on and can be reached at [phone]//thanks/dbw     MRI of hip will be scheduled today

## 2019-12-09 NOTE — TELEPHONE ENCOUNTER
Dr. Duarte   Mrs. Bailye reports that she is presently experiencing body aches, was seen by her PCP on last Monday and was given Prednisone 40mg for 5 days and Toradol 10mg and now she has completed both and would like to know what to do    Walk in

## 2019-12-10 ENCOUNTER — PATIENT MESSAGE (OUTPATIENT)
Dept: RHEUMATOLOGY | Facility: CLINIC | Age: 54
End: 2019-12-10

## 2019-12-10 RX ORDER — PREDNISONE 2.5 MG/1
TABLET ORAL
Qty: 30 TABLET | Refills: 0 | Status: SHIPPED | OUTPATIENT
Start: 2019-12-10 | End: 2020-04-30

## 2019-12-10 NOTE — TELEPHONE ENCOUNTER
Spoke with Mrs. Bailey informed her that per Dr. Pastor it's likely prednisone withdrawal she will do slower taper. New prx sent to pharmacy. Mrs. Bailey voiced understanding

## 2020-01-01 ENCOUNTER — PATIENT MESSAGE (OUTPATIENT)
Dept: RHEUMATOLOGY | Facility: CLINIC | Age: 55
End: 2020-01-01

## 2020-01-02 ENCOUNTER — HOSPITAL ENCOUNTER (OUTPATIENT)
Dept: RADIOLOGY | Facility: HOSPITAL | Age: 55
Discharge: HOME | End: 2020-01-02
Attending: STUDENT IN AN ORGANIZED HEALTH CARE EDUCATION/TRAINING PROGRAM
Payer: MEDICARE

## 2020-01-02 ENCOUNTER — OFFICE VISIT (OUTPATIENT)
Dept: RHEUMATOLOGY | Facility: CLINIC | Age: 55
End: 2020-01-02
Payer: MEDICARE

## 2020-01-02 VITALS
BODY MASS INDEX: 17.4 KG/M2 | HEIGHT: 62 IN | DIASTOLIC BLOOD PRESSURE: 65 MMHG | WEIGHT: 94.56 LBS | HEART RATE: 94 BPM | SYSTOLIC BLOOD PRESSURE: 113 MMHG

## 2020-01-02 DIAGNOSIS — M32.9 SYSTEMIC LUPUS ERYTHEMATOSUS, UNSPECIFIED SLE TYPE, UNSPECIFIED ORGAN INVOLVEMENT STATUS: ICD-10-CM

## 2020-01-02 DIAGNOSIS — Z79.52 IMMUNOSUPPRESSION DUE TO CHRONIC STEROID USE: ICD-10-CM

## 2020-01-02 DIAGNOSIS — T38.0X5A IMMUNOSUPPRESSION DUE TO CHRONIC STEROID USE: ICD-10-CM

## 2020-01-02 DIAGNOSIS — D84.821 IMMUNOSUPPRESSION DUE TO CHRONIC STEROID USE: ICD-10-CM

## 2020-01-02 DIAGNOSIS — M06.9 RHEUMATOID ARTHRITIS, INVOLVING UNSPECIFIED SITE, UNSPECIFIED RHEUMATOID FACTOR PRESENCE: Primary | ICD-10-CM

## 2020-01-02 DIAGNOSIS — M06.9 RHEUMATOID ARTHRITIS, INVOLVING UNSPECIFIED SITE, UNSPECIFIED RHEUMATOID FACTOR PRESENCE: ICD-10-CM

## 2020-01-02 PROCEDURE — 72050 X-RAY EXAM NECK SPINE 4/5VWS: CPT | Mod: 26,,, | Performed by: RADIOLOGY

## 2020-01-02 PROCEDURE — 99215 OFFICE O/P EST HI 40 MIN: CPT | Mod: PBBFAC,25 | Performed by: STUDENT IN AN ORGANIZED HEALTH CARE EDUCATION/TRAINING PROGRAM

## 2020-01-02 PROCEDURE — 99999 PR PBB SHADOW E&M-EST. PATIENT-LVL V: CPT | Mod: PBBFAC,,, | Performed by: STUDENT IN AN ORGANIZED HEALTH CARE EDUCATION/TRAINING PROGRAM

## 2020-01-02 PROCEDURE — 99999 PR PBB SHADOW E&M-EST. PATIENT-LVL V: ICD-10-PCS | Mod: PBBFAC,,, | Performed by: STUDENT IN AN ORGANIZED HEALTH CARE EDUCATION/TRAINING PROGRAM

## 2020-01-02 PROCEDURE — 72050 XR CERVICAL SPINE AP LAT WITH FLEX EXTEN: ICD-10-PCS | Mod: 26,,, | Performed by: RADIOLOGY

## 2020-01-02 PROCEDURE — 99214 PR OFFICE/OUTPT VISIT, EST, LEVL IV, 30-39 MIN: ICD-10-PCS | Mod: S$PBB,,, | Performed by: STUDENT IN AN ORGANIZED HEALTH CARE EDUCATION/TRAINING PROGRAM

## 2020-01-02 PROCEDURE — 99214 OFFICE O/P EST MOD 30 MIN: CPT | Mod: S$PBB,,, | Performed by: STUDENT IN AN ORGANIZED HEALTH CARE EDUCATION/TRAINING PROGRAM

## 2020-01-02 PROCEDURE — 72050 X-RAY EXAM NECK SPINE 4/5VWS: CPT | Mod: TC

## 2020-01-02 RX ORDER — OLANZAPINE 10 MG/1
10 TABLET ORAL NIGHTLY PRN
COMMUNITY

## 2020-01-02 RX ORDER — PYRIDOSTIGMINE BROMIDE 60 MG/1
TABLET ORAL
COMMUNITY
End: 2020-04-30 | Stop reason: SDUPTHER

## 2020-01-02 RX ORDER — TEMAZEPAM 30 MG/1
30 CAPSULE ORAL NIGHTLY
COMMUNITY

## 2020-01-02 RX ORDER — DIPHENHYDRAMINE HCL 25 MG
25 TABLET ORAL NIGHTLY PRN
COMMUNITY

## 2020-01-02 RX ORDER — LEVOMEFOLATE CALCIUM 7.5 MG
7.5 TABLET ORAL DAILY
COMMUNITY
End: 2020-02-05

## 2020-01-02 RX ORDER — PROCHLORPERAZINE MALEATE 10 MG
10 TABLET ORAL 2 TIMES DAILY
COMMUNITY

## 2020-01-03 ENCOUNTER — PATIENT MESSAGE (OUTPATIENT)
Dept: RHEUMATOLOGY | Facility: CLINIC | Age: 55
End: 2020-01-03

## 2020-01-03 DIAGNOSIS — M32.9 SYSTEMIC LUPUS ERYTHEMATOSUS, UNSPECIFIED SLE TYPE, UNSPECIFIED ORGAN INVOLVEMENT STATUS: Primary | ICD-10-CM

## 2020-01-03 RX ORDER — CHLORZOXAZONE 500 MG/1
750 TABLET ORAL 3 TIMES DAILY
COMMUNITY

## 2020-01-05 RX ORDER — METHOTREXATE 20 MG/.4ML
20 INJECTION, SOLUTION SUBCUTANEOUS
Qty: 4 SYRINGE | Refills: 3 | Status: SHIPPED | OUTPATIENT
Start: 2020-01-05 | End: 2020-04-30 | Stop reason: SDUPTHER

## 2020-01-06 ENCOUNTER — TELEPHONE (OUTPATIENT)
Dept: PHARMACY | Facility: CLINIC | Age: 55
End: 2020-01-06

## 2020-01-06 DIAGNOSIS — M32.9 SYSTEMIC LUPUS ERYTHEMATOSUS, UNSPECIFIED SLE TYPE, UNSPECIFIED ORGAN INVOLVEMENT STATUS: ICD-10-CM

## 2020-01-06 NOTE — PROGRESS NOTES
RHEUMATOLOGY OUTPATIENT CLINIC NOTE    Attending Rheumatologist: Bella Pastor  Primary Care Provider: Primary Doctor No   Physician Requesting Consultation: No referring provider defined for this encounter.  Chief Complaint/Reason For Consultation:  Joint pain    Subjective:       YESSI Bailey is a 54 y.o. White female    -Diagnosed w SLE 20 years ago, had previously been following with a rheumatologist in [place] , Dr Purvis.  Main manifestations: transaminitis, fatigue, pericarditis, photosensitivity, malar rash, and 2 miscarriages (1 in first trimester, and one in second).  Recently with worsening pain in bilateral shoulders, mcps, and pips. AM stiffness >30min. Taking tylenol prn pain relief. Taking plaquenil 200mg daily, has overall been stable with this regimen.      Main complaint is generalized pain and fatigue. C/o episodic facial rash and was previously told was discoid rash? Has not seen dermatologist. Not currently present.     Review of Systems   Constitutional: Negative for chills, fever and weight loss.   HENT: Negative for congestion, hearing loss, sinus pain and sore throat.    Eyes: Negative for blurred vision, photophobia, pain and redness.   Respiratory: Negative for cough, sputum production and shortness of breath.    Cardiovascular: Negative for chest pain, palpitations and leg swelling.   Gastrointestinal: Negative for abdominal pain, blood in stool, heartburn, nausea and vomiting.   Genitourinary: Negative for dysuria, frequency and hematuria.   Musculoskeletal: Negative for back pain,+ joint pain, myalgias and neck pain.   Skin: Negative for itching and rash.   Neurological: Negative for dizziness, focal weakness, seizures and headaches.   Endo/Heme/Allergies: Negative for polydipsia. Does not bruise/bleed easily.   All other systems reviewed and are negative.        Chronic comorbid conditions affecting medical decision making today:  Past Medical History:   Diagnosis Date     Chiari malformation type I     Dysautonomia     Gastroparesis     Hashimoto's disease     Lupus      Past Surgical History:   Procedure Laterality Date    APPENDECTOMY      HYSTERECTOMY      INSERTION OF PERCUTANEOUS ENDOSCOPIC GASTROSTOMY (PEG) FEEDING TUBE      SINUS SURGERY       Family History   Adopted: Yes     Social History     Substance and Sexual Activity   Alcohol Use Never    Frequency: Never     Social History     Tobacco Use   Smoking Status Current Every Day Smoker    Packs/day: 1.00     Social History     Substance and Sexual Activity   Drug Use Never       Current Outpatient Medications:     chlorzoxazone (PARAFON FORTE) 500 mg Tab, Take 500 mg by mouth 3 (three) times daily., Disp: , Rfl:     diphenhydrAMINE (SOMINEX) 25 mg tablet, Take 25 mg by mouth nightly as needed for Insomnia., Disp: , Rfl:     docusate sodium (COLACE) 100 MG capsule, Take 100 mg by mouth., Disp: , Rfl:     EPINEPHrine (EPIPEN) 0.3 mg/0.3 mL AtIn, epinephrine 0.3 mg/0.3 mL injection, auto-injector, Disp: , Rfl:     ergocalciferol (ERGOCALCIFEROL) 50,000 unit Cap, TK 1 C PO ONCE A MONTH, Disp: , Rfl: 3    hydroxychloroquine (PLAQUENIL) 200 mg tablet, Take 1 tablet (200 mg total) by mouth once daily., Disp: 30 tablet, Rfl: 6    levomefolate calcium (L-METHYLFOLATE) 7.5 mg Tab tablet, Take 7.5 mg by mouth once daily., Disp: , Rfl:     midodrine (PROAMATINE) 5 MG Tab, 10 mg. , Disp: , Rfl: 11    OLANZapine (ZYPREXA) 10 MG tablet, Take 10 mg by mouth every evening., Disp: , Rfl:     ondansetron (ZOFRAN ODT) 4 MG TbDL, Zofran ODT 4 mg disintegrating tablet  Let 1 tablet disolve in mouth every 6-8 hours as needed by oral route., Disp: , Rfl:     pantoprazole (PROTONIX) 40 MG tablet, pantoprazole 40 mg tablet,delayed release, Disp: , Rfl:     predniSONE (DELTASONE) 2.5 MG tablet, Take 4 tablets daily in AM by mouth x 3 days, then 3 tablets daily x 3 days, then 2 tablets daily x 3 days, then 1 tablet daily x3  days, Disp: 30 tablet, Rfl: 0    prochlorperazine (COMPAZINE) 10 MG tablet, Take 10 mg by mouth 3 (three) times daily., Disp: , Rfl:     promethazine (PHENERGAN) 25 MG tablet, promethazine 25 mg tablet, Disp: , Rfl:     promethazine (PHENERGAN) 6.25 mg/5 mL syrup, TK 20 ML PO QD PRN, Disp: , Rfl: 0    pyridostigmine (MESTINON) 60 mg Tab, pyridostigmine bromide 60 mg tablet, Disp: , Rfl:     pyridostigmine (MESTINON) 60 mg Tab, Mestinon  60mg TID, Disp: , Rfl:     temazepam (RESTORIL) 30 mg capsule, Take 30 mg by mouth every evening., Disp: , Rfl:     thyroid, pork, (ARMOUR THYROID) 30 mg Tab, [place] Thyroid, Disp: , Rfl:     topiramate 25 mg capsule, 50 mg 2 (two) times daily. , Disp: , Rfl:     XIIDRA 5 % Dpet, INT 1 GTT IN BOTH EYES BID, Disp: , Rfl: 11    clobetasol 0.05% (TEMOVATE) 0.05 % Oint, Apply topically 2 (two) times daily. (Patient not taking: Reported on 1/2/2020), Disp: 15 g, Rfl: 1    clobetasol 0.05% (TEMOVATE) 0.05 % Oint, Apply topically to the affected areas twice daily (Patient not taking: Reported on 1/2/2020), Disp: 15 g, Rfl: 1    folic acid (FOLVITE) 1 MG tablet, Take 1 tablet (1 mg total) by mouth once daily., Disp: 30 tablet, Rfl: 4    folic acid (FOLVITE) 1 MG tablet, Take 1 tablet by mouth every day., Disp: 30 tablet, Rfl: 2    gabapentin (NEURONTIN) 600 MG tablet, 100 mg by FEEDING TUBE route., Disp: , Rfl:     hydroxychloroquine (PLAQUENIL) 200 mg tablet, hydroxychloroquine 200 mg tablet  TK 1 T PO QD, Disp: , Rfl:     methocarbamol (ROBAXIN) 750 MG Tab, methocarbamol 750 mg tablet  TAKE 1 TABLET BY MOUTH THREE TIMES DAILY, Disp: , Rfl:     methotrexate/PF (RASUVO, PF,) 20 mg/0.4 mL AtIn, Inject 0.4 mLs (20 mg total) into the skin every 7 days., Disp: 4 Syringe, Rfl: 3       Objective:         Vitals:    01/02/20 1127   BP: 113/65   Pulse: 94       Physical Exam   Nursing note and vitals reviewed.  Constitutional:  oriented to person, place, and time and well-developed,  well-nourished, and in no distress. No distress.   HENT:   Head: Normocephalic and atraumatic.   Right Ear: External ear normal.   Left Ear: External ear normal.   Nose: Nose normal.   Mouth/Throat: Oropharynx is clear and moist.   Eyes: Conjunctivae and EOM are normal. Pupils are equal, round, and reactive to light. Right eye exhibits no discharge. Left eye exhibits no discharge.   Neck: Normal range of motion. Neck supple. No JVD present.   Cardiovascular: Normal rate, regular rhythm and normal heart sounds.    Pulmonary/Chest: Effort normal and breath sounds normal. No respiratory distress. She has no wheezes. She has no rales.  exhibits no tenderness.   Abdominal: Soft. Bowel sounds are normal.  exhibits no distension. There is no tenderness. There is no rebound.   Lymphadenopathy:  no cervical adenopathy.   Neurological: alert and oriented to person, place, and time. No cranial nerve deficit. Gait normal.   Skin: Skin is warm and dry. No rash noted. No erythema.   Psychiatric: Affect and judgment normal.   Musculoskeletal: No synovitis over small joints in hands and feet. No effusions over large joints.      Reviewed old and all outside pertinent medical records available.    All lab results personally reviewed and interpreted by me.  Lab Results   Component Value Date    WBC 9.43 01/02/2020    HGB 13.8 01/02/2020    HCT 44.1 01/02/2020     (H) 01/02/2020    MCH 31.8 (H) 01/02/2020    MCHC 31.3 (L) 01/02/2020    RDW 13.2 01/02/2020     01/02/2020    MPV 10.8 01/02/2020       Lab Results   Component Value Date     01/02/2020    K 4.0 01/02/2020     (H) 01/02/2020    CO2 21 (L) 01/02/2020    GLU 84 01/02/2020    BUN 6 01/02/2020    CALCIUM 9.7 01/02/2020    PROT 7.0 01/02/2020    ALBUMIN 4.0 01/02/2020    BILITOT 0.3 01/02/2020    AST 17 01/02/2020    ALKPHOS 105 01/02/2020    ALT 20 01/02/2020       Lab Results   Component Value Date    COLORU Yellow 01/02/2020    APPEARANCEUA Clear  01/02/2020    SPECGRAV <=1.005 01/02/2020    PHUR 6.0 01/02/2020    PROTEINUA Negative 01/02/2020    KETONESU Negative 01/02/2020    LEUKOCYTESUR Negative 01/02/2020    NITRITE Negative 01/02/2020       Lab Results   Component Value Date    CRP 13.4 (H) 01/02/2020       Lab Results   Component Value Date    SEDRATE 10 01/02/2020       Lab Results   Component Value Date    SEDRATE 10 01/02/2020       No components found for: 25OHVITDTOT, 03EGTXAD3, 66FJBAPO1, METHODNOTE    No results found for: URICACID    No components found for: TSPOTTB    Procedures         ASSESSMENT / PLAN:     Kayla Bailey is a 54 y.o. White female with:    #SLE  -diagnosed 20+ years ago, had been following w Dr Purvis in [place]   -main manifestations: malar rash, photosensitivity, fatigue, pericarditis  -disease activity markers quiescent, will repeat today  -C/w  plaquenil 200mg daily  -Increase rasuvo to 20mg weekly +FA daily    #Long term use steroids  -will check dexa    #Rash  -Reports episodic flares of facial rash, previously dx as discoid. No hx biopsy. Not currently present. Will refer to dermatology    #High risk med use  -No sign of infection. Hold if infection     rtc 3 months  Method of contact with patient concerns: [facility]    Time spent: 45 minutes in face to face discussion concerning diagnosis, prognosis, review of lab and test results, benefits of treatment as well as management of disease, counseling of patient and coordination of care between various health care providers . Greater than half the time spent was used for coordination of care and counseling of patient.    Bella Pastor M.D.  Rheumatology Department   [facility] [place] [address], [place] [address]  Phone: [phone]  Fax: [phone]

## 2020-01-07 ENCOUNTER — DOCUMENTATION ONLY (OUTPATIENT)
Dept: RHEUMATOLOGY | Facility: CLINIC | Age: 55
End: 2020-01-07

## 2020-01-31 RX ORDER — FOLIC ACID 1 MG/1
1 TABLET ORAL DAILY
Qty: 90 TABLET | Refills: 2 | Status: SHIPPED | OUTPATIENT
Start: 2020-01-31 | End: 2020-01-31 | Stop reason: SDUPTHER

## 2020-01-31 NOTE — TELEPHONE ENCOUNTER
----- Message from Irma Angel PharmD sent at 1/31/2020  2:52 PM CST -----  Regarding: Folic acid  Good afternoon Dr. Pastor and staff,    [facility] requested a new prescription for Ms. Bailey's folic acid, requesting 90 day supply. The rx received is still 30 tabs w/2 refills.    Can we update to 90 tabs? Would you approve any additional refills?    Please advise. Thank you,  Irma Angel, PharmD  [facility]  [phone]

## 2020-02-05 ENCOUNTER — TELEPHONE (OUTPATIENT)
Dept: PHARMACY | Facility: CLINIC | Age: 55
End: 2020-02-05

## 2020-02-05 RX ORDER — FOLIC ACID 1 MG/1
TABLET ORAL
Qty: 90 TABLET | Refills: 3 | Status: SHIPPED | OUTPATIENT
Start: 2020-02-05

## 2020-02-05 NOTE — TELEPHONE ENCOUNTER
Spoke with patient regarding refills Rasuvo, HCQ and folic acid. Patient confirms need for refill, she has 1 dose Rasuvo on hand for today and a few days on hand FA and HCQ. Folic acid rx updated to 90 day supply, same cash price for patient. Shipment set for  for patient to receive . Address and  verified. No changes to report with other medications, conditions or allergies. No missed doses or illness. She does report some nausea after her MTX dose, has been managing with medication and she says it has been improving over last few weeks. Started with dose increase MTX last month. She will let [facility]/MD office know if does not get better or gets worse. Patient is still taking medications as directed. No new sharps container needed. No other questions or concerns today, patient will let [facility] know if anything comes up.

## 2020-02-28 ENCOUNTER — TELEPHONE (OUTPATIENT)
Dept: PHARMACY | Facility: CLINIC | Age: 55
End: 2020-02-28

## 2020-03-18 ENCOUNTER — TELEPHONE (OUTPATIENT)
Dept: RADIOLOGY | Facility: HOSPITAL | Age: 55
End: 2020-03-18

## 2020-04-01 ENCOUNTER — TELEPHONE (OUTPATIENT)
Dept: PHARMACY | Facility: CLINIC | Age: 55
End: 2020-04-01

## 2020-04-29 RX ORDER — METHOTREXATE 20 MG/.4ML
20 INJECTION, SOLUTION SUBCUTANEOUS
Qty: 1.6 ML | Refills: 3 | Status: SHIPPED | OUTPATIENT
Start: 2020-04-29

## 2020-04-30 ENCOUNTER — TELEPHONE (OUTPATIENT)
Dept: PHARMACY | Facility: CLINIC | Age: 55
End: 2020-04-30

## 2020-04-30 RX ORDER — MORPHINE SULFATE 30 MG/1
30 TABLET, FILM COATED, EXTENDED RELEASE ORAL 2 TIMES DAILY
COMMUNITY

## 2020-04-30 RX ORDER — ONDANSETRON HYDROCHLORIDE 8 MG/1
8 TABLET, FILM COATED ORAL 2 TIMES DAILY
COMMUNITY

## 2020-04-30 RX ORDER — MIDODRINE HYDROCHLORIDE 10 MG/1
10 TABLET ORAL 4 TIMES DAILY
COMMUNITY

## 2020-04-30 RX ORDER — TOPIRAMATE 50 MG/1
50 TABLET, FILM COATED ORAL 2 TIMES DAILY
COMMUNITY

## 2020-04-30 RX ORDER — OXYCODONE HYDROCHLORIDE 10 MG/1
10 TABLET ORAL EVERY 4 HOURS PRN
COMMUNITY

## 2020-04-30 NOTE — TELEPHONE ENCOUNTER
Spoke with patient regarding MTX, folic acid and plaquenil refills. Patient confirmed need for refill, shipment set for  for patient to receive . Address and  verified. Patient did miss 2 weeks over last month due to UTI but has resumed and is feeling better. No side effects to report. $10.99 copay (004).

## 2020-06-02 ENCOUNTER — TELEPHONE (OUTPATIENT)
Dept: RHEUMATOLOGY | Facility: CLINIC | Age: 55
End: 2020-06-02

## 2020-06-08 ENCOUNTER — TELEPHONE (OUTPATIENT)
Dept: PHARMACY | Facility: CLINIC | Age: 55
End: 2020-06-08

## 2020-06-08 NOTE — TELEPHONE ENCOUNTER
Called pt to set up refill for PLAQUENIL And METHOTREXATE. Pt has about 14 days left of medication. Pt did not miss any doses. Pt will need medication  on 06/28, [facility] will follow up with pt 06/23

## 2020-06-23 ENCOUNTER — TELEPHONE (OUTPATIENT)
Dept: PHARMACY | Facility: CLINIC | Age: 55
End: 2020-06-23

## 2020-07-22 NOTE — TELEPHONE ENCOUNTER
Confirmed Rasuvo, Plaquenil and FA shipment  to arrive to patient home . Patient reported 2 doses of Rasuvo, 14 doses of Plaquenil and 14 doses of FA on hand at this time. She stated she missed one dose of Rasuvo due to an infection of her feeding tube. Also held Plaquenil appropriately while on antibiotics. Address and  verified. $10.99 copay (004).

## 2020-09-18 ENCOUNTER — TELEPHONE (OUTPATIENT)
Dept: PHARMACY | Facility: CLINIC | Age: 55
End: 2020-09-18

## 2020-12-29 ENCOUNTER — SPECIALTY PHARMACY (OUTPATIENT)
Dept: PHARMACY | Facility: CLINIC | Age: 55
End: 2020-12-29

## 2020-12-29 NOTE — TELEPHONE ENCOUNTER
"Called patient in regards to Folic Acid, Plaquenil, and Rasuvo. Folic acid pending refill approval, resent request to Dr. Primo Wang- verified with patient. Patient states for [facility] took her off of it for now, has not been cleared to take again. Patient states she has 1 week of Plaquenil left on hand. Insurance rejecting for "filled after coverage . HOLD ON CARD:CALL [phone] FOR INFO". Patient states she has no new insurance but she said it may be rejecting due to having hospice insurance? Alerting PA team and [facility] to look further into this.   "

## 2020-12-30 NOTE — TELEPHONE ENCOUNTER
Patient insurance coverage still active. [employer] has  - notified FA team to work on assistance/paul renewal.     LVM with [facility] to assess appropriateness of continuing Plaquenil as patient reported that she is on hospice

## 2021-01-05 NOTE — TELEPHONE ENCOUNTER
[facility] faxed in new prescription for Plaquenil with notation that patient is to continue therapy while on hospice.

## 2021-01-07 ENCOUNTER — SPECIALTY PHARMACY (OUTPATIENT)
Dept: PHARMACY | Facility: CLINIC | Age: 56
End: 2021-01-07

## 2021-03-31 ENCOUNTER — PATIENT MESSAGE (OUTPATIENT)
Dept: PHARMACY | Facility: CLINIC | Age: 56
End: 2021-03-31

## 2021-04-07 ENCOUNTER — PATIENT MESSAGE (OUTPATIENT)
Dept: PHARMACY | Facility: CLINIC | Age: 56
End: 2021-04-07

## 2021-04-13 ENCOUNTER — SPECIALTY PHARMACY (OUTPATIENT)
Dept: PHARMACY | Facility: CLINIC | Age: 56
End: 2021-04-13

## 2021-05-12 ENCOUNTER — PATIENT MESSAGE (OUTPATIENT)
Dept: RESEARCH | Facility: HOSPITAL | Age: 56
End: 2021-05-12

## 2021-07-14 ENCOUNTER — PATIENT MESSAGE (OUTPATIENT)
Dept: PHARMACY | Facility: CLINIC | Age: 56
End: 2021-07-14

## 2021-07-16 ENCOUNTER — PATIENT MESSAGE (OUTPATIENT)
Dept: PHARMACY | Facility: CLINIC | Age: 56
End: 2021-07-16

## 2021-07-21 ENCOUNTER — PATIENT MESSAGE (OUTPATIENT)
Dept: PHARMACY | Facility: CLINIC | Age: 56
End: 2021-07-21

## 2021-07-26 ENCOUNTER — PATIENT MESSAGE (OUTPATIENT)
Dept: PHARMACY | Facility: CLINIC | Age: 56
End: 2021-07-26

## 2021-07-30 ENCOUNTER — SPECIALTY PHARMACY (OUTPATIENT)
Dept: PHARMACY | Facility: CLINIC | Age: 56
End: 2021-07-30

## 2021-11-08 ENCOUNTER — SPECIALTY PHARMACY (OUTPATIENT)
Dept: PHARMACY | Facility: CLINIC | Age: 56
End: 2021-11-08
Payer: MEDICARE

## 2022-04-18 ENCOUNTER — PATIENT MESSAGE (OUTPATIENT)
Dept: ADMINISTRATIVE | Facility: OTHER | Age: 57
End: 2022-04-18
Payer: MEDICARE

## 2022-12-23 NOTE — TELEPHONE ENCOUNTER
Dr. Pastor please advise   Klisyri Counseling:  I discussed with the patient the risks of Klisyri including but not limited to erythema, scaling, itching, weeping, crusting, and pain.

## 2023-01-23 ENCOUNTER — TELEPHONE (OUTPATIENT)
Dept: NEUROLOGY | Facility: CLINIC | Age: 58
End: 2023-01-23
Payer: MEDICARE

## 2023-01-23 NOTE — TELEPHONE ENCOUNTER
Spoke with pt about incorrect appointment being scheduled, pt stated did not need to be seen and did not schedule appointment. Let pt know appointment will be cancelled. Pt understood, verbalized understanding.

## 2024-10-24 ENCOUNTER — PATIENT MESSAGE (OUTPATIENT)
Dept: GASTROENTEROLOGY | Facility: CLINIC | Age: 59
End: 2024-10-24
Payer: MEDICARE